# Patient Record
Sex: FEMALE | Race: WHITE | NOT HISPANIC OR LATINO | Employment: FULL TIME | URBAN - METROPOLITAN AREA
[De-identification: names, ages, dates, MRNs, and addresses within clinical notes are randomized per-mention and may not be internally consistent; named-entity substitution may affect disease eponyms.]

---

## 2018-06-12 ENCOUNTER — OFFICE VISIT (OUTPATIENT)
Dept: ENDOCRINOLOGY | Facility: MEDICAL CENTER | Age: 48
End: 2018-06-12
Payer: COMMERCIAL

## 2018-06-12 VITALS
WEIGHT: 145 LBS | BODY MASS INDEX: 21.48 KG/M2 | OXYGEN SATURATION: 99 % | HEART RATE: 95 BPM | HEIGHT: 69 IN | RESPIRATION RATE: 12 BRPM

## 2018-06-12 DIAGNOSIS — E55.9 VITAMIN D DEFICIENCY: Primary | ICD-10-CM

## 2018-06-12 DIAGNOSIS — E03.9 HYPOTHYROIDISM, UNSPECIFIED TYPE: Chronic | ICD-10-CM

## 2018-06-12 PROCEDURE — 99244 OFF/OP CNSLTJ NEW/EST MOD 40: CPT | Performed by: INTERNAL MEDICINE

## 2018-06-13 NOTE — PROGRESS NOTES
Chief Complaint   Patient presents with   • Hypothyroidism              CHIEF COMPLAINT:      Endocrine evaluation requested by DIEGO Buckley for this __ year old young lady with hypothyroidism.     PRESENT ILLNESS:     The patient was first diagnosed as hypothyroid six years ago.  She does not recall symptoms at that time.  She was tested because her mother has a history of hypothyroidism.  Her tests were sufficiently abnormal apparently that levothyroxine 25 mcg per day was prescribed.  She does not recall feeling any differently taking the thyroid hormone.  Over years, she has gradually been increased to 88 mcg per day.  Still without any noticeable benefit but along the way symptoms have evolved.  Her last dose change she thinks was over a year ago.  I do have a TSH level from last August of 3.2 and a free thyroxin of 1.0.  At that time she was on the 88 mcg dose per day.     She has a sense of chronic fatigue that seemed to start about 11 years ago that seemed to start after her second child.  She now has four children between the ages of 7 and 18.  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  She is able to manage that particular job and her family and household.     She has never been aware of any enlargement of the thyroid gland and no mention made of thyroid enlargement or thyroid antibodies.     She has had thinning scalp hair.  She does have muscle pain and weakness.  There was an element of anxiety at times.      She has gone to the emergency room twice because of a feeling in her heart that it wasn’t beating correctly.  She has been told that she has PVCs but no heart disease.  She has had a cardiac stress test, Holter monitor and cardiac echo all of which I am told are okay.  Overnight oximetry also has been normal.      Weight has been stable.  She is a slender, fair complected young lady.  She is not on a radical diet, feels she eats in a healthy.   She does take a multiple vitamin and in addition takes vitamin D 4000 units per day.  Last fall her vitamin D level was normal at 36.     On my examination, I cannot feel her thyroid gland.  It is quite small.  She appears euthyroid and I did not detect any arrhythmia.      We will update her lab including anti-thyroid antibodies which is a common cause of hypothyroidism.  Pending out come of lab, I might be inclined to adjust her dose up if her TSH is 2.5 or above.  We will discuss that through MyChart or telephone when I see results.    For the moment, I doubt that her symptoms are thyroid related and I would be reluctant to be too aggressive with thyroid replacement because of her history of PVCs.  She is a very busy homemaker, wife and mother of four children, and with a full-time job which can easily account for the way she feels. On the other hand, we should maximize her thyroid replacement for whatever benefit.    ROS:   Experiencing occasional PVCs but no other symptoms of significant cardiovascular disease      Allergies: No Known Allergies    Current medicines including changes today:  Current Outpatient Prescriptions   Medication Sig Dispense Refill   • levothyroxine (SYNTHROID) 75 MCG Tab TAKE 1 TABLET ORALLY EVERY MORNING ON AN EMPTY STOMACH 30 Tab 6   • Cholecalciferol (VITAMIN D) 2000 UNITS CAPS Take  by mouth.     • therapeutic multivitamin-minerals (THERAGRAN-M) Tab Take 1 Tab by mouth every day.     • Ranitidine HCl (ZANTAC 150 MAXIMUM STRENGTH PO) Take  by mouth.     • albuterol (VENTOLIN OR PROVENTIL) 108 (90 BASE) MCG/ACT AERS Inhale 1-2 Puffs by mouth every four hours as needed for Shortness of Breath. 1 Inhaler 0     No current facility-administered medications for this visit.         Past Medical History:   Diagnosis Date   • Asthma, allergic    • HPV test positive 7/2013    Normal pap, positive HPV   • HTN, white coat    • Inverse psoriasis     perineal area   • Right bundle branch block   "  • Seasonal allergies    • Thyroid disease       Family history         Mother is hypothyroid. No other family history of thyroid disease    Social history         . Very busy family life with 4 children ages 7-18        Works full-time out of her home         Occasionally drinks wine but not to excess and not regularly. Does not smoke cigarettes or use recreational or illegal drugs      PHYSICAL EXAM:    Pulse 95   Resp 12   Ht 1.753 m (5' 9\")   Wt 65.8 kg (145 lb)   SpO2 99%   BMI 21.41 kg/m²       Gen.   appears healthy. Slender but not underweight. Fair complected    Skin   appropriate for sex and age, careful about sun exposure    HEENT  unremarkable    Neck   thyroid gland is about normal size without palpable nodules    Heart  regular, no PVCs or irregular rhythm during this examination    Extremities  no edema    Neuro  gait and station normal, no tremor    Psych  appropriate, pleasant, calm    ASSESSMENT AND RECOMMENDATIONS    1. Hypothyroidism, unspecified type              Question of appropriate replacement              Follow-up labs by my chart and adjust thyroid replacement if appropriate  - FREE THYROXINE; Future  - TSH; Future  - ANTITHYROGLOBULIN AB; Future  - THYROID PEROXIDASE  (TPO) AB; Future    2. Vitamin D deficiency  - VITAMIN D,25 HYDROXY; Future      DISPOSITION:  Follow-up results on my chart       Adrian Loomis M.D.    Copies to: FATOUMATA BuckleyN.P. [phone]  "

## 2018-08-22 ENCOUNTER — TELEPHONE (OUTPATIENT)
Dept: ENDOCRINOLOGY | Facility: MEDICAL CENTER | Age: 48
End: 2018-08-22

## 2018-08-22 NOTE — TELEPHONE ENCOUNTER
Telephone conversation with patient    I reviewed test results which look reasonably good. Her general chemistries are fine as are her lipids. Vitamin D is 50 taking about 4400 units of vitamin D per day so that is enough but not toxic    She does not have anti-thyroid antibodies. Thyroglobulin antibodies negative and TPO antibody negative.    TSH is 2.47 and free T4 mid range at 1.0. I don't think increasing her thyroid is going to be helpful to her and that might provoke more arrhythmia. She is still having occasional episodes of what she refers to his PVCs. She is trying to draw connection between that and gastrointestinal symptoms and perhaps she is right. In any rate where not going to increase her thyroid dosing and she is in agreement.    Adrian Loomis M.D.    Copies to Stacy RAMÍREZ

## 2020-08-27 PROBLEM — R03.0 WHITE COAT SYNDROME WITHOUT HYPERTENSION: Status: ACTIVE | Noted: 2020-08-27

## 2021-10-14 ENCOUNTER — HOSPITAL ENCOUNTER (OUTPATIENT)
Facility: MEDICAL CENTER | Age: 51
End: 2021-10-14
Attending: ANESTHESIOLOGY
Payer: MEDICARE

## 2021-10-14 LAB — COVID ORDER STATUS COVID19: NORMAL

## 2022-08-01 PROBLEM — M54.16 LUMBAR RADICULOPATHY: Status: ACTIVE | Noted: 2022-08-01

## 2023-08-15 PROBLEM — N95.1 PERIMENOPAUSAL: Status: ACTIVE | Noted: 2023-08-15

## 2024-03-13 PROBLEM — N92.0 MENORRHAGIA WITH REGULAR CYCLE: Status: ACTIVE | Noted: 2024-03-13

## 2024-03-13 PROBLEM — N90.5 VULVAR ATROPHY: Status: ACTIVE | Noted: 2024-03-13
